# Patient Record
Sex: FEMALE | Race: ASIAN | NOT HISPANIC OR LATINO
[De-identification: names, ages, dates, MRNs, and addresses within clinical notes are randomized per-mention and may not be internally consistent; named-entity substitution may affect disease eponyms.]

---

## 2022-02-28 ENCOUNTER — TRANSCRIPTION ENCOUNTER (OUTPATIENT)
Age: 22
End: 2022-02-28

## 2022-02-28 PROBLEM — Z00.00 ENCOUNTER FOR PREVENTIVE HEALTH EXAMINATION: Status: ACTIVE | Noted: 2022-02-28

## 2022-03-01 ENCOUNTER — NON-APPOINTMENT (OUTPATIENT)
Age: 22
End: 2022-03-01

## 2022-03-01 ENCOUNTER — APPOINTMENT (OUTPATIENT)
Dept: ORTHOPEDIC SURGERY | Facility: CLINIC | Age: 22
End: 2022-03-01
Payer: COMMERCIAL

## 2022-03-01 ENCOUNTER — APPOINTMENT (OUTPATIENT)
Dept: ORTHOPEDIC SURGERY | Facility: CLINIC | Age: 22
End: 2022-03-01

## 2022-03-01 VITALS — OXYGEN SATURATION: 96 % | SYSTOLIC BLOOD PRESSURE: 123 MMHG | DIASTOLIC BLOOD PRESSURE: 75 MMHG | HEART RATE: 84 BPM

## 2022-03-01 VITALS — WEIGHT: 123 LBS | HEIGHT: 62 IN | BODY MASS INDEX: 22.63 KG/M2

## 2022-03-01 DIAGNOSIS — M79.671 PAIN IN RIGHT FOOT: ICD-10-CM

## 2022-03-01 PROCEDURE — 99204 OFFICE O/P NEW MOD 45 MIN: CPT

## 2022-03-01 PROCEDURE — 73630 X-RAY EXAM OF FOOT: CPT | Mod: RT

## 2022-03-01 RX ORDER — DICLOFENAC SODIUM 50 MG/1
50 TABLET, DELAYED RELEASE ORAL TWICE DAILY
Qty: 28 | Refills: 0 | Status: ACTIVE | COMMUNITY
Start: 2022-03-01 | End: 1900-01-01

## 2022-03-01 NOTE — DISCUSSION/SUMMARY
[de-identified] : 5th mt base fx\par \par cam boot\par \par pt\par \par nsaids\par \par elevate ice compression\par \par \par fu 4 weeks

## 2022-03-01 NOTE — HISTORY OF PRESENT ILLNESS
[de-identified] : 22 yo female fell down 3 steps while doing laundry and injured her right foot. She complains of pain, swelling, and bruising to the lateral aspect of her foot. She states she has increased pain when she tries to weight bear. She was seen in a local urgent care and was told she had a foot fracture and placed in a short leg posterior splint with crutches.

## 2022-03-01 NOTE — PHYSICAL EXAM
[de-identified] : Physical Examination\par General: well nourished, in no acute distress, alert and oriented to person, place and time\par Psychiatric: normal mood and affect, no abnormal movements or speech patterns\par Eyes: vision intact [Without] glasses\par \par Musculoskeletal Examination\par Ambulation	[+] antalgic gait, [+] assistive devices\par \par Ankle			Right			Left\par General\par 	Deformity       	mild lateral foot swelling			normal	\par 	Skin/Edema   	normal			normal\par 	Erythema       	-			-\par 	Alignment      	neutral			neutral\par Range of Motion\par 	Ankle Dorsiflex	0			20\par 	Ankle Plantarflex	25			45\par 	Inversion        	0			15\par 	Eversion		0			5\par Drawer\par 	ATFL		-			-\par 	CFL	                	-			-\par 	PTFL		-			-\par Palpation\par 	ATFL		-			-\par 	Medial Heel   	-			-\par 	Other		5th mt base			-\par Strength Examination/Atrophy\par 	EHL 		5+			5+\par 	FHL 		5+			5+\par 	Tibialis Anterior	5+			5+\par 	Achilles/Soleus	4+			5+\par Sensation\par 	Deep Peroneal	normal			normal\par 	Super Peroneal 	normal			normal\par 	Sural 		normal			normal\par 	Posterior Tibial 	normal			normal\par 	Saphenous    	normal			normal\par Pulses\par 	DP   		2+			2+\par \par  [de-identified] : 3 views of the affected right ankle, (AP, Oblique and Lateral)\par 2-26-22\par 3 views of the affected right foot (AP, Oblique and Lateral)\par were ordered, obtained and evaluated by myself today and\par demonstrate:\par 5th mt base fx avulsion, mild displacement

## 2022-03-22 ENCOUNTER — APPOINTMENT (OUTPATIENT)
Dept: ORTHOPEDIC SURGERY | Facility: CLINIC | Age: 22
End: 2022-03-22
Payer: COMMERCIAL

## 2022-03-22 PROCEDURE — 73630 X-RAY EXAM OF FOOT: CPT | Mod: RT

## 2022-03-22 PROCEDURE — 99213 OFFICE O/P EST LOW 20 MIN: CPT

## 2022-03-22 NOTE — HISTORY OF PRESENT ILLNESS
[de-identified] : 20 yo female , with 3 week PT, nsaids, cam boot fu to acute onset of 3 weeks activity related pain to the right foot after she fell down 3 steps while doing laundry and injured her right foot. She complains of pain, swelling, and bruising to the lateral aspect of her foot. She states she has increased pain when she tries to weight bear. She was seen in a local urgent care and was told she had a foot fracture and placed in a short leg posterior splint with crutches. \par \par occasional crutch use\par occasional cam boot use\par no crutches today\par pain 4-6/10

## 2022-03-22 NOTE — DISCUSSION/SUMMARY
[de-identified] : 5th mt base fx\par \par cam boot w/ crutches for 2 more weeks\par \par physical therapy to start in 2 weeks\par \par nsaids\par \par elevate ice compression\par \par fu after PT

## 2022-03-22 NOTE — PHYSICAL EXAM
[de-identified] : Physical Examination\par General: well nourished, in no acute distress, alert and oriented to person, place and time\par Psychiatric: normal mood and affect, no abnormal movements or speech patterns\par Eyes: vision intact [Without] glasses\par \par Musculoskeletal Examination\par Ambulation	[+] antalgic gait, [-] assistive devices\par \par Ankle			Right			Left\par General\par 	Deformity       	resolved mild lateral foot swelling			normal	\par 	Skin/Edema   	normal			normal\par 	Erythema       	-			-\par 	Alignment      	neutral			neutral\par Range of Motion\par 	Ankle Dorsiflex	0			20\par 	Ankle Plantarflex	25			45\par 	Inversion        	0			15\par 	Eversion		0			5\par Drawer\par 	ATFL		-			-\par 	CFL	                	-			-\par 	PTFL		-			-\par Palpation\par 	ATFL		-			-\par 	Medial Heel   	-			-\par 	Other		5th mt base and head to compression			-\par Strength Examination/Atrophy\par 	EHL 		5+			5+\par 	FHL 		5+			5+\par 	Tibialis Anterior	5+			5+\par 	Achilles/Soleus	4+			5+\par Sensation\par 	Deep Peroneal	normal			normal\par 	Super Peroneal 	normal			normal\par 	Sural 		normal			normal\par 	Posterior Tibial 	normal			normal\par 	Saphenous    	normal			normal\par Pulses\par 	DP   		2+			2+\par \par  [de-identified] : 3 views of the affected right ankle, (AP, Oblique and Lateral)\par 2-26-22\par \par 3 views of the affected right foot (AP, Oblique and Lateral)\par 3-1-22\par demonstrate:\par 5th mt base fx avulsion, mild displacement\par \par 3 views of the affected right foot (AP, Oblique and Lateral)\par were ordered, obtained and evaluated by myself today and\par demonstrate:\par 5th mt base fx avulsion, mild displacement

## 2022-06-15 ENCOUNTER — APPOINTMENT (OUTPATIENT)
Dept: ORTHOPEDIC SURGERY | Facility: CLINIC | Age: 22
End: 2022-06-15

## 2022-06-17 ENCOUNTER — APPOINTMENT (OUTPATIENT)
Dept: ORTHOPEDIC SURGERY | Facility: CLINIC | Age: 22
End: 2022-06-17
Payer: COMMERCIAL

## 2022-06-17 DIAGNOSIS — S92.351A DISPLACED FRACTURE OF FIFTH METATARSAL BONE, RIGHT FOOT, INITIAL ENCOUNTER FOR CLOSED FRACTURE: ICD-10-CM

## 2022-06-17 PROCEDURE — 73630 X-RAY EXAM OF FOOT: CPT | Mod: RT

## 2022-06-17 PROCEDURE — 99212 OFFICE O/P EST SF 10 MIN: CPT

## 2022-06-17 NOTE — PHYSICAL EXAM
[de-identified] : Physical Examination\par General: well nourished, in no acute distress, alert and oriented to person, place and time\par Psychiatric: normal mood and affect, no abnormal movements or speech patterns\par Eyes: vision intact [Without] glasses\par \par Musculoskeletal Examination\par Ambulation	[-] antalgic gait, [-] assistive devices\par \par Ankle			Right			Left\par General\par 	Deformity       	normal			normal	\par 	Skin/Edema   	normal			normal\par 	Erythema       	-			-\par 	Alignment      	neutral			neutral\par Range of Motion\par 	Ankle Dorsiflex	10			20\par 	Ankle Plantarflex	40			45\par 	Inversion        	10			15\par 	Eversion		5			5\par Drawer\par 	ATFL		-			-\par 	CFL	                	-			-\par 	PTFL		-			-\par Palpation\par 	ATFL		-			-\par 	Medial Heel   	-			-\par 	Other		-			-\par Strength Examination/Atrophy\par 	EHL 		5+			5+\par 	FHL 		5+			5+\par 	Tibialis Anterior	5+			5+\par 	Achilles/Soleus	5+			5+\par Sensation\par 	Deep Peroneal	normal			normal\par 	Super Peroneal 	normal			normal\par 	Sural 		normal			normal\par 	Posterior Tibial 	normal			normal\par 	Saphenous    	normal			normal\par Pulses\par 	DP   		2+			2+\par \par  [de-identified] : 3 views of the affected right ankle, (AP, Oblique and Lateral)\par 2-26-22\par \par 3 views of the affected right foot (AP, Oblique and Lateral)\par 3-1-22\par demonstrate:\par 5th mt base fx avulsion, mild displacement\par \par 3 views of the affected right foot (AP, Oblique and Lateral)\par 3-22\par demonstrate:\par 5th mt base fx avulsion, mild displacement\par \par 3 views of the affected right foot (AP, Oblique and Lateral)\par were ordered, obtained and evaluated by myself today and\par demonstrate:\par 5th mt base fx avulsion possible consolidation

## 2022-06-17 NOTE — HISTORY OF PRESENT ILLNESS
[de-identified] : 22 yo female , fu to acute onset of 3 weeks activity related pain to the right foot after she fell down 3 steps while doing laundry and injured her right foot. She complains of pain, swelling, and bruising to the lateral aspect of her foot. She states she has increased pain when she tries to weight bear. She was seen in a local urgent care and was told she had a foot fracture and placed in a short leg posterior splint with crutches. \par \par trace latreral pain\par some toe pain\par nail complaints